# Patient Record
Sex: MALE | Race: WHITE | NOT HISPANIC OR LATINO | Employment: OTHER | URBAN - METROPOLITAN AREA
[De-identification: names, ages, dates, MRNs, and addresses within clinical notes are randomized per-mention and may not be internally consistent; named-entity substitution may affect disease eponyms.]

---

## 2023-05-03 ENCOUNTER — HOSPITAL ENCOUNTER (EMERGENCY)
Facility: HOSPITAL | Age: 78
Discharge: HOME | End: 2023-05-03
Attending: EMERGENCY MEDICINE
Payer: MEDICARE

## 2023-05-03 VITALS
SYSTOLIC BLOOD PRESSURE: 157 MMHG | WEIGHT: 240 LBS | DIASTOLIC BLOOD PRESSURE: 74 MMHG | OXYGEN SATURATION: 95 % | TEMPERATURE: 99 F | HEIGHT: 68 IN | HEART RATE: 89 BPM | BODY MASS INDEX: 36.37 KG/M2 | RESPIRATION RATE: 19 BRPM

## 2023-05-03 DIAGNOSIS — R06.02 SHORTNESS OF BREATH: ICD-10-CM

## 2023-05-03 DIAGNOSIS — J32.9 SINUSITIS, UNSPECIFIED CHRONICITY, UNSPECIFIED LOCATION: ICD-10-CM

## 2023-05-03 DIAGNOSIS — N18.9 CHRONIC KIDNEY DISEASE, UNSPECIFIED CKD STAGE: ICD-10-CM

## 2023-05-03 DIAGNOSIS — R05.9 COUGH: ICD-10-CM

## 2023-05-03 DIAGNOSIS — R79.89 ELEVATED BRAIN NATRIURETIC PEPTIDE (BNP) LEVEL: ICD-10-CM

## 2023-05-03 DIAGNOSIS — J40 BRONCHITIS: Primary | ICD-10-CM

## 2023-05-03 LAB
ALBUMIN SERPL BCP-MCNC: 4.3 G/DL (ref 3.5–5.2)
ALP SERPL-CCNC: 80 U/L (ref 38–126)
ALT SERPL W/O P-5'-P-CCNC: 22 U/L (ref 10–44)
ANION GAP SERPL CALC-SCNC: 12 MMOL/L (ref 8–16)
AST SERPL-CCNC: 31 U/L (ref 15–46)
BASOPHILS # BLD AUTO: 0.02 K/UL (ref 0–0.2)
BASOPHILS NFR BLD: 0.3 % (ref 0–1.9)
BILIRUB SERPL-MCNC: 0.5 MG/DL (ref 0.1–1)
CALCIUM SERPL-MCNC: 8.9 MG/DL (ref 8.7–10.5)
CHLORIDE SERPL-SCNC: 106 MMOL/L (ref 95–110)
CO2 SERPL-SCNC: 23 MMOL/L (ref 23–29)
CREAT SERPL-MCNC: 1.96 MG/DL (ref 0.5–1.4)
DIFFERENTIAL METHOD: ABNORMAL
EOSINOPHIL # BLD AUTO: 0.2 K/UL (ref 0–0.5)
EOSINOPHIL NFR BLD: 2.4 % (ref 0–8)
ERYTHROCYTE [DISTWIDTH] IN BLOOD BY AUTOMATED COUNT: 14.6 % (ref 11.5–14.5)
EST. GFR  (NO RACE VARIABLE): 34.8 ML/MIN/1.73 M^2
GLUCOSE SERPL-MCNC: 136 MG/DL (ref 70–110)
GROUP A STREP, MOLECULAR: NEGATIVE
HCT VFR BLD AUTO: 34.9 % (ref 40–54)
HGB BLD-MCNC: 11.2 G/DL (ref 14–18)
IMM GRANULOCYTES # BLD AUTO: 0.01 K/UL (ref 0–0.04)
IMM GRANULOCYTES NFR BLD AUTO: 0.1 % (ref 0–0.5)
INFLUENZA A, MOLECULAR: NEGATIVE
INFLUENZA B, MOLECULAR: NEGATIVE
LYMPHOCYTES # BLD AUTO: 1.3 K/UL (ref 1–4.8)
LYMPHOCYTES NFR BLD: 16.1 % (ref 18–48)
MCH RBC QN AUTO: 28.4 PG (ref 27–31)
MCHC RBC AUTO-ENTMCNC: 32.1 G/DL (ref 32–36)
MCV RBC AUTO: 88 FL (ref 82–98)
MONOCYTES # BLD AUTO: 0.7 K/UL (ref 0.3–1)
MONOCYTES NFR BLD: 8.8 % (ref 4–15)
NEUTROPHILS # BLD AUTO: 5.7 K/UL (ref 1.8–7.7)
NEUTROPHILS NFR BLD: 72.3 % (ref 38–73)
NRBC BLD-RTO: 0 /100 WBC
NT-PROBNP SERPL-MCNC: 3500 PG/ML (ref 5–1800)
PLATELET # BLD AUTO: 227 K/UL (ref 150–450)
PMV BLD AUTO: 8.5 FL (ref 9.2–12.9)
POTASSIUM SERPL-SCNC: 4 MMOL/L (ref 3.5–5.1)
PROT SERPL-MCNC: 8 G/DL (ref 6–8.4)
RBC # BLD AUTO: 3.95 M/UL (ref 4.6–6.2)
SARS-COV-2 RDRP RESP QL NAA+PROBE: NEGATIVE
SODIUM SERPL-SCNC: 141 MMOL/L (ref 136–145)
SPECIMEN SOURCE: NORMAL
TROPONIN I SERPL-MCNC: <0.012 NG/ML (ref 0.01–0.03)
UUN UR-MCNC: 33 MG/DL (ref 2–20)
WBC # BLD AUTO: 7.82 K/UL (ref 3.9–12.7)

## 2023-05-03 PROCEDURE — 87651 STREP A DNA AMP PROBE: CPT | Mod: ER | Performed by: PHYSICIAN ASSISTANT

## 2023-05-03 PROCEDURE — 87502 INFLUENZA DNA AMP PROBE: CPT | Mod: ER | Performed by: EMERGENCY MEDICINE

## 2023-05-03 PROCEDURE — 93010 EKG 12-LEAD: ICD-10-PCS | Mod: ,,, | Performed by: INTERNAL MEDICINE

## 2023-05-03 PROCEDURE — 99900035 HC TECH TIME PER 15 MIN (STAT): Mod: ER

## 2023-05-03 PROCEDURE — 80053 COMPREHEN METABOLIC PANEL: CPT | Mod: ER | Performed by: PHYSICIAN ASSISTANT

## 2023-05-03 PROCEDURE — 63700000 PHARM REV CODE 250 ALT 637 W/O HCPCS: Mod: ER | Performed by: PHYSICIAN ASSISTANT

## 2023-05-03 PROCEDURE — 85025 COMPLETE CBC W/AUTO DIFF WBC: CPT | Mod: ER | Performed by: PHYSICIAN ASSISTANT

## 2023-05-03 PROCEDURE — 93010 ELECTROCARDIOGRAM REPORT: CPT | Mod: ,,, | Performed by: INTERNAL MEDICINE

## 2023-05-03 PROCEDURE — 99285 EMERGENCY DEPT VISIT HI MDM: CPT | Mod: 25,ER

## 2023-05-03 PROCEDURE — 84484 ASSAY OF TROPONIN QUANT: CPT | Mod: ER | Performed by: PHYSICIAN ASSISTANT

## 2023-05-03 PROCEDURE — 94760 N-INVAS EAR/PLS OXIMETRY 1: CPT | Mod: ER

## 2023-05-03 PROCEDURE — 83880 ASSAY OF NATRIURETIC PEPTIDE: CPT | Mod: ER | Performed by: PHYSICIAN ASSISTANT

## 2023-05-03 PROCEDURE — 93005 ELECTROCARDIOGRAM TRACING: CPT | Mod: ER

## 2023-05-03 PROCEDURE — U0002 COVID-19 LAB TEST NON-CDC: HCPCS | Mod: ER | Performed by: EMERGENCY MEDICINE

## 2023-05-03 RX ORDER — AZITHROMYCIN 250 MG/1
500 TABLET, FILM COATED ORAL
Status: COMPLETED | OUTPATIENT
Start: 2023-05-03 | End: 2023-05-03

## 2023-05-03 RX ORDER — AZITHROMYCIN 250 MG/1
250 TABLET, FILM COATED ORAL DAILY
Qty: 6 TABLET | Refills: 0 | Status: SHIPPED | OUTPATIENT
Start: 2023-05-03

## 2023-05-03 RX ADMIN — AZITHROMYCIN MONOHYDRATE 500 MG: 250 TABLET ORAL at 07:05

## 2023-05-04 NOTE — DISCHARGE INSTRUCTIONS
As discussed, your chest x-ray shows no fluid or pneumonia.  Your COVID, flu, and strep tests were all negative.  I have sent in antibiotics for you, and will give you your 1st dose here.  It is important that you follow-up with your PCP as discussed.  Return to the emergency department with any worsening symptoms or concerns.  Take care.  Thank you for allowing me to take care of you today.    And remember, I owe you $2.05 :)

## 2023-05-04 NOTE — ED PROVIDER NOTES
Encounter Date: 5/3/2023       History     Chief Complaint   Patient presents with    Fever     Fever, headache, hoarseness, sob with exertion. Started 2-3 days ago. Last dose tylenol at 1:30pm.      Patient is a 77-year-old male with history of AFib on chronic anticoagulation, chronic kidney disease, diabetes, CHF who presents to the emergency department with sore throat and cough.  Patient reports over the last several days he is not been feeling well.  He reports intermittent fevers.  He reports posttussive vomiting.  He reports headache.  He reports hoarseness.  He reports he does feel short of breath when he starts coughing.  Denies any lower extremity swelling worse than his baseline.  He reports he is here for work.  He reports he really just wants to have antibiotics because he knows this is nothing in his chest.  He reports he feels like it is all upper airway.  He denies any chest pain.  He reports he is compliant with all of his medications and sees his doctor regularly back at home in [place].    The history is provided by the patient.   Review of patient's allergies indicates:   Allergen Reactions    Garlic Hives    Onion Hives     No past medical history on file.  No past surgical history on file.  No family history on file.     Review of Systems   Constitutional:  Positive for chills and fever. Negative for activity change and appetite change.   HENT:  Positive for congestion, postnasal drip, rhinorrhea, sinus pressure, sore throat and voice change. Negative for ear discharge and ear pain.    Respiratory:  Positive for cough and shortness of breath.    Cardiovascular:  Negative for chest pain.   Gastrointestinal:  Positive for vomiting. Negative for abdominal pain, blood in stool, constipation, diarrhea and nausea.   Genitourinary:  Negative for dysuria, flank pain and hematuria.   Musculoskeletal:  Negative for back pain, neck pain and neck stiffness.   Skin:  Negative for rash and wound.    Neurological:  Negative for dizziness, weakness, light-headedness and headaches.     Physical Exam     Initial Vitals [05/03/23 1658]   BP Pulse Resp Temp SpO2   (!) 148/71 96 19 98.7 °F (37.1 °C) (!) 94 %      MAP       --         Physical Exam    Nursing note and vitals reviewed.  Constitutional: He appears well-developed and well-nourished. He is not diaphoretic.  Non-toxic appearance. No distress.   HENT:   Head: Normocephalic.   Right Ear: Hearing, tympanic membrane, external ear and ear canal normal.   Left Ear: Hearing, tympanic membrane, external ear and ear canal normal.   Nose: Mucosal edema present.   Mouth/Throat: Uvula is midline and mucous membranes are normal. No trismus in the jaw. No uvula swelling. Posterior oropharyngeal erythema present. No oropharyngeal exudate.   Eyes: Conjunctivae are normal. Pupils are equal, round, and reactive to light.   Neck:   Normal range of motion.  Cardiovascular:  Normal rate.           Irregular rhythm; bilateral lower extremity edema - pt reports is baseline   Pulmonary/Chest: Breath sounds normal. He has no wheezes.   Abdominal: Abdomen is soft. Bowel sounds are normal. There is no abdominal tenderness.   Musculoskeletal:      Cervical back: Normal range of motion.     Lymphadenopathy:     He has no cervical adenopathy.   Neurological: He is alert and oriented to person, place, and time. He has normal strength. No cranial nerve deficit or sensory deficit. GCS score is 15. GCS eye subscore is 4. GCS verbal subscore is 5. GCS motor subscore is 6.   Skin: Skin is warm and dry. Capillary refill takes less than 2 seconds.   Psychiatric: He has a normal mood and affect.       ED Course   Procedures  Labs Reviewed   CBC W/ AUTO DIFFERENTIAL - Abnormal; Notable for the following components:       Result Value    RBC 3.95 (*)     Hemoglobin 11.2 (*)     Hematocrit 34.9 (*)     RDW 14.6 (*)     MPV 8.5 (*)     Lymph % 16.1 (*)     All other components within normal limits    COMPREHENSIVE METABOLIC PANEL - Abnormal; Notable for the following components:    Glucose 136 (*)     BUN 33 (*)     Creatinine 1.96 (*)     eGFR 34.8 (*)     All other components within normal limits   NT-PRO NATRIURETIC PEPTIDE - Abnormal; Notable for the following components:    NT-proBNP 3500 (*)     All other components within normal limits   INFLUENZA A & B BY MOLECULAR   GROUP A STREP, MOLECULAR   SARS-COV-2 RNA AMPLIFICATION, QUAL    Narrative:     Is the patient symptomatic?->Yes   TROPONIN I   URINALYSIS, REFLEX TO URINE CULTURE     EKG Readings: (Independently Interpreted)   Initial Reading: No STEMI. Rhythm: Atrial Fibrillation.     Imaging Results              X-Ray Chest PA And Lateral (Final result)  Result time 05/03/23 18:16:48      Final result by Natalie Sapp MD (05/03/23 18:16:48)                   Impression:      No active finding      Electronically signed by: Natalie Sapp  Date:    05/03/2023  Time:    18:16               Narrative:    EXAMINATION:  XR CHEST PA AND LATERAL    CLINICAL HISTORY:  Cough, unspecified    TECHNIQUE:  PA and lateral views of the chest were performed.    COMPARISON:  None    FINDINGS:  Lungs are well aerated.  Mediastinal contour normal midline.  No sizable pleural effusion                                    X-Rays:   Independently Interpreted Readings:   Other Readings:  Cardiomegaly with no pulmonary edema or opacity  Medications - No data to display  Medical Decision Making:   Initial Assessment:   Urgent evaluation of a 77-year-old male with history of CKD, AFib on chronic anticoagulation, diabetes, CHF, hypertension who presents to the emergency department with cough and sore throat.  Patient is afebrile, nontoxic-appearing, and hemodynamically stable.  Satting at 95% on room air.  Lungs are clear to auscultation.  Bilateral lower extremity edema which patient reports is baseline.  Posterior oropharyngeal erythema.  No exudate.  Uvula is midline.   Patient is controlling secretions.  No evidence of peritonsillar abscess.  Patient does have nasal mucosal edema.  He does have hoarseness.  Patient is ambulated and oxygen remains 94% or above on room air.  Labs reveal mild anemia.  Kidney insufficiency that patient reports is baseline for him.  BNP is elevated, but this could be due secondary to his renal failure.  Chest x-ray shows no pulmonary edema.  No pneumonia.  Patient continually states he feels like everything is upper airway and he really would just like antibiotics.  I will give a Z-Michael for more anti-inflammatory effect than antimicrobial.  Patient can not have steroids as he is diabetic and requesting no steroids.  He reports he will travel back home tomorrow to see his PCP.  Discussed case in depth with supervising physician who agrees with plan.  Clinical Tests:   Lab Tests: Ordered and Reviewed  Radiological Study: Ordered and Reviewed                        Clinical Impression:   Final diagnoses:  [R05.9] Cough  [R06.02] Shortness of breath  [J40] Bronchitis (Primary)  [J32.9] Sinusitis, unspecified chronicity, unspecified location        ED Disposition Condition    Discharge Stable          ED Prescriptions       Medication Sig Dispense Start Date End Date Auth. Provider    azithromycin (Z-MICHAEL) 250 MG tablet Take 1 tablet (250 mg total) by mouth once daily. Take first 2 tablets together, then 1 every day until finished. 6 tablet 5/3/2023 -- Luz Maria Jessica PA-C          Follow-up Information    None          Luz Maria Jessica PA-C  05/03/23 1922